# Patient Record
Sex: FEMALE | Race: WHITE | NOT HISPANIC OR LATINO | Employment: FULL TIME | URBAN - METROPOLITAN AREA
[De-identification: names, ages, dates, MRNs, and addresses within clinical notes are randomized per-mention and may not be internally consistent; named-entity substitution may affect disease eponyms.]

---

## 2024-10-19 ENCOUNTER — HOSPITAL ENCOUNTER (EMERGENCY)
Facility: HOSPITAL | Age: 24
Discharge: HOME | End: 2024-10-19
Attending: EMERGENCY MEDICINE

## 2024-10-19 VITALS
SYSTOLIC BLOOD PRESSURE: 141 MMHG | DIASTOLIC BLOOD PRESSURE: 86 MMHG | WEIGHT: 228 LBS | HEIGHT: 64 IN | TEMPERATURE: 98 F | HEART RATE: 94 BPM | OXYGEN SATURATION: 98 % | BODY MASS INDEX: 38.93 KG/M2 | RESPIRATION RATE: 18 BRPM

## 2024-10-19 DIAGNOSIS — K04.7 DENTAL ABSCESS: ICD-10-CM

## 2024-10-19 DIAGNOSIS — K08.89 PAIN, DENTAL: Primary | ICD-10-CM

## 2024-10-19 LAB — B-HCG UR QL: NEGATIVE

## 2024-10-19 PROCEDURE — 81025 URINE PREGNANCY TEST: CPT | Performed by: PHYSICIAN ASSISTANT

## 2024-10-19 PROCEDURE — 99283 EMERGENCY DEPT VISIT LOW MDM: CPT

## 2024-10-19 RX ORDER — CLINDAMYCIN HYDROCHLORIDE 150 MG/1
300 CAPSULE ORAL 4 TIMES DAILY
Qty: 56 CAPSULE | Refills: 0 | Status: SHIPPED | OUTPATIENT
Start: 2024-10-19 | End: 2024-10-26

## 2024-10-19 NOTE — ED NOTES
Pt requesting to leave before treatment complete so that she can make it to work on time. Pt reports she will return later, in hopes of receiving antibiotics for her dental issues. Notified Registration, Charge RN and MD. Attempted to come back and tell the patient but she had already left the ER upon my return.

## 2024-10-19 NOTE — ED PROVIDER NOTES
Encounter Date: 10/19/2024       History     Chief Complaint   Patient presents with    Dental Pain     Began 1 week ago in the top right of the mouth and has worsened.      Patient presents to the ER with complaint of left upper and right lower dental pain.  The patient states it has been going on for last several days states she has not been evaluated by a dentist recently reports it is painful nonradiating nothing makes it better brushing her teeth and touching around the tooth makes it worse and denies other associated symptoms.    The history is provided by the patient.     Review of patient's allergies indicates:  No Known Allergies  Past Medical History:   Diagnosis Date    ADHD     Anxiety     Depression     PTSD (post-traumatic stress disorder)      History reviewed. No pertinent surgical history.  No family history on file.  Social History     Tobacco Use    Smoking status: Every Day     Current packs/day: 0.50     Types: Cigarettes    Smokeless tobacco: Never   Substance Use Topics    Alcohol use: Yes     Comment: Occasional    Drug use: No     Types: Marijuana     Comment: quit 2 years ago     Review of Systems   Constitutional:  Negative for chills and fever.   HENT:  Positive for dental problem. Negative for congestion, drooling, ear discharge, ear pain, facial swelling, hearing loss and sore throat.    Respiratory:  Negative for shortness of breath.    Cardiovascular:  Negative for chest pain.   Gastrointestinal:  Negative for abdominal pain, constipation, diarrhea, nausea and vomiting.   Genitourinary:  Negative for dysuria.   Musculoskeletal:  Negative for back pain.   Skin:  Negative for rash.   Neurological:  Negative for weakness.   Hematological:  Does not bruise/bleed easily.   All other systems reviewed and are negative.      Physical Exam     Initial Vitals [10/19/24 1530]   BP Pulse Resp Temp SpO2   (!) 141/86 94 18 98.2 °F (36.8 °C) 98 %      MAP       --         Physical Exam    Nursing  note and vitals reviewed.  Constitutional: She appears well-developed and well-nourished.   HENT:   Head: Atraumatic. Mouth/Throat: Oropharynx is clear and moist and mucous membranes are normal. She does not have dentures. Abnormal dentition. Dental abscesses and dental caries present. No uvula swelling.   Eyes: Right eye exhibits no discharge. Left eye exhibits no discharge.   Neck: Neck supple.   Normal range of motion.  Cardiovascular:  Normal rate, regular rhythm, normal heart sounds and intact distal pulses.     Exam reveals no gallop and no friction rub.       No murmur heard.  Pulmonary/Chest: Breath sounds normal. No respiratory distress. She has no wheezes. She has no rhonchi. She has no rales. She exhibits no tenderness.   Musculoskeletal:         General: Normal range of motion.      Cervical back: Normal range of motion and neck supple.     Neurological: She is alert and oriented to person, place, and time. GCS score is 15. GCS eye subscore is 4. GCS verbal subscore is 5. GCS motor subscore is 6.   Skin: Skin is warm and dry. Capillary refill takes less than 2 seconds.   Psychiatric: She has a normal mood and affect. Thought content normal.         ED Course   Procedures  Labs Reviewed   PREGNANCY TEST, URINE RAPID       Result Value    Preg Test, Ur Negative      Narrative:     Specimen Source->Urine          Imaging Results    None          Medications - No data to display  Medical Decision Making  Discussed with the patient exam findings plan of care with prescribed medications discussed with the patient need for follow up with Dentistry as well as return to ER precautions discussed use of over-the-counter Tylenol with the prescribed Naprosyn the patient verbalized her understanding and her questions were answered.  At time of discharge patient is stable nontoxic appearing and is stable for outpatient management.      Differential diagnosis includes but is not limited to periapical abscess,  periodontal abscess, dental abscess, dental fracture, dental isael     This note was created using [employer] voice recognition software that occasionally misinterpreted phrases or words.    Problems Addressed:  Dental abscess: acute illness or injury  Pain, dental: acute illness or injury    Risk  Prescription drug management.                                      Clinical Impression:  Final diagnoses:  [K08.89] Pain, dental (Primary)  [K04.7] Dental abscess          ED Disposition Condition    Discharge Stable          ED Prescriptions       Medication Sig Dispense Start Date End Date Auth. Provider    clindamycin (CLEOCIN) 150 MG capsule Take 2 capsules (300 mg total) by mouth 4 (four) times daily. for 7 days 56 capsule 10/19/2024 10/26/2024 Raheem An PA          Follow-up Information       Follow up With Specialties Details Why Contact [facility] Emergency Medicine  If symptoms worsen [address] [facility] [address]  [phone]    Your PCP  Schedule an appointment as soon as possible for a visit in 1 day               Raheem An PA  10/19/24 2040

## 2024-12-14 ENCOUNTER — HOSPITAL ENCOUNTER (EMERGENCY)
Facility: HOSPITAL | Age: 24
Discharge: HOME | End: 2024-12-14
Attending: EMERGENCY MEDICINE

## 2024-12-14 VITALS
SYSTOLIC BLOOD PRESSURE: 133 MMHG | BODY MASS INDEX: 38.93 KG/M2 | DIASTOLIC BLOOD PRESSURE: 83 MMHG | RESPIRATION RATE: 20 BRPM | TEMPERATURE: 98 F | OXYGEN SATURATION: 99 % | HEIGHT: 64 IN | WEIGHT: 228 LBS | HEART RATE: 96 BPM

## 2024-12-14 DIAGNOSIS — R55 NEAR SYNCOPE: ICD-10-CM

## 2024-12-14 DIAGNOSIS — R55 VASOVAGAL SYMPTOM: Primary | ICD-10-CM

## 2024-12-14 LAB
ALBUMIN SERPL BCP-MCNC: 4.1 G/DL (ref 3.5–5.2)
ALP SERPL-CCNC: 63 U/L (ref 40–150)
ALT SERPL W/O P-5'-P-CCNC: 12 U/L (ref 10–44)
ANION GAP SERPL CALC-SCNC: 9 MMOL/L (ref 8–16)
AST SERPL-CCNC: 21 U/L (ref 10–40)
B-HCG UR QL: NEGATIVE
BACTERIA #/AREA URNS HPF: NORMAL /HPF
BASOPHILS # BLD AUTO: 0.04 K/UL (ref 0–0.2)
BASOPHILS NFR BLD: 0.4 % (ref 0–1.9)
BILIRUB SERPL-MCNC: 1.2 MG/DL (ref 0.1–1)
BILIRUB UR QL STRIP: NEGATIVE
BUN SERPL-MCNC: 6 MG/DL (ref 6–20)
CALCIUM SERPL-MCNC: 9.5 MG/DL (ref 8.7–10.5)
CHLORIDE SERPL-SCNC: 105 MMOL/L (ref 95–110)
CLARITY UR: CLEAR
CO2 SERPL-SCNC: 24 MMOL/L (ref 23–29)
COLOR UR: YELLOW
CREAT SERPL-MCNC: 0.7 MG/DL (ref 0.5–1.4)
DIFFERENTIAL METHOD BLD: NORMAL
EOSINOPHIL # BLD AUTO: 0.2 K/UL (ref 0–0.5)
EOSINOPHIL NFR BLD: 1.6 % (ref 0–8)
ERYTHROCYTE [DISTWIDTH] IN BLOOD BY AUTOMATED COUNT: 12.1 % (ref 11.5–14.5)
EST. GFR  (NO RACE VARIABLE): >60 ML/MIN/1.73 M^2
GLUCOSE SERPL-MCNC: 93 MG/DL (ref 70–110)
GLUCOSE UR QL STRIP: NEGATIVE
HCT VFR BLD AUTO: 44.8 % (ref 37–48.5)
HGB BLD-MCNC: 15.2 G/DL (ref 12–16)
HGB UR QL STRIP: ABNORMAL
HYALINE CASTS #/AREA URNS LPF: 0 /LPF
IMM GRANULOCYTES # BLD AUTO: 0.03 K/UL (ref 0–0.04)
IMM GRANULOCYTES NFR BLD AUTO: 0.3 % (ref 0–0.5)
KETONES UR QL STRIP: NEGATIVE
LEUKOCYTE ESTERASE UR QL STRIP: NEGATIVE
LYMPHOCYTES # BLD AUTO: 2.9 K/UL (ref 1–4.8)
LYMPHOCYTES NFR BLD: 28.3 % (ref 18–48)
MCH RBC QN AUTO: 29.3 PG (ref 27–31)
MCHC RBC AUTO-ENTMCNC: 33.9 G/DL (ref 32–36)
MCV RBC AUTO: 86 FL (ref 82–98)
MICROSCOPIC COMMENT: NORMAL
MONOCYTES # BLD AUTO: 0.5 K/UL (ref 0.3–1)
MONOCYTES NFR BLD: 4.7 % (ref 4–15)
NEUTROPHILS # BLD AUTO: 6.7 K/UL (ref 1.8–7.7)
NEUTROPHILS NFR BLD: 64.7 % (ref 38–73)
NITRITE UR QL STRIP: NEGATIVE
NRBC BLD-RTO: 0 /100 WBC
PH UR STRIP: 7 [PH] (ref 5–8)
PLATELET # BLD AUTO: 294 K/UL (ref 150–450)
PMV BLD AUTO: 9.9 FL (ref 9.2–12.9)
POTASSIUM SERPL-SCNC: 4 MMOL/L (ref 3.5–5.1)
PROT SERPL-MCNC: 7.8 G/DL (ref 6–8.4)
PROT UR QL STRIP: ABNORMAL
RBC # BLD AUTO: 5.19 M/UL (ref 4–5.4)
RBC #/AREA URNS HPF: 2 /HPF (ref 0–4)
SODIUM SERPL-SCNC: 138 MMOL/L (ref 136–145)
SP GR UR STRIP: >=1.03 (ref 1–1.03)
SQUAMOUS #/AREA URNS HPF: 5 /HPF
URN SPEC COLLECT METH UR: ABNORMAL
UROBILINOGEN UR STRIP-ACNC: 1 EU/DL
WBC # BLD AUTO: 10.34 K/UL (ref 3.9–12.7)
WBC #/AREA URNS HPF: 2 /HPF (ref 0–5)

## 2024-12-14 PROCEDURE — 96361 HYDRATE IV INFUSION ADD-ON: CPT

## 2024-12-14 PROCEDURE — 81000 URINALYSIS NONAUTO W/SCOPE: CPT | Performed by: EMERGENCY MEDICINE

## 2024-12-14 PROCEDURE — 85025 COMPLETE CBC W/AUTO DIFF WBC: CPT | Performed by: EMERGENCY MEDICINE

## 2024-12-14 PROCEDURE — 80053 COMPREHEN METABOLIC PANEL: CPT | Performed by: EMERGENCY MEDICINE

## 2024-12-14 PROCEDURE — 99284 EMERGENCY DEPT VISIT MOD MDM: CPT | Mod: 25

## 2024-12-14 PROCEDURE — 81025 URINE PREGNANCY TEST: CPT | Performed by: EMERGENCY MEDICINE

## 2024-12-14 PROCEDURE — 96360 HYDRATION IV INFUSION INIT: CPT

## 2024-12-14 PROCEDURE — 25000003 PHARM REV CODE 250: Performed by: EMERGENCY MEDICINE

## 2024-12-14 PROCEDURE — 93010 ELECTROCARDIOGRAM REPORT: CPT | Mod: ,,, | Performed by: INTERNAL MEDICINE

## 2024-12-14 PROCEDURE — 93005 ELECTROCARDIOGRAM TRACING: CPT

## 2024-12-14 RX ADMIN — SODIUM CHLORIDE 500 ML: 9 INJECTION, SOLUTION INTRAVENOUS at 03:12

## 2024-12-14 NOTE — ED PROVIDER NOTES
"   History     Chief Complaint   Patient presents with    Dizziness     PT stated she was at work got really dizzy and thought she was going to pass out. Secondary c/o "feeling like she has to poop but she can't and it's painful."     HPI:  Ivon Garcia is a 24 y.o. female with PMH as below who presents to the [facility] emergency department for evaluation of near-syncopal sensation at work where she suddenly felt dizzy. She had diarrhea yesterday, then today felt sensation like she needed to have a BM. She also notes her last menstrual period was irregular, she has an  birth control implant, and she has cramping lower abdominal pain. She also mentions she's only had Mountain Dew and extra sweet tea to drink for the last 4 months with no pure water intake.     PCP: Eliane Soni MD    Review of patient's allergies indicates:  No Known Allergies   Past Medical History:   Diagnosis Date    ADHD     Anxiety     Depression     PTSD (post-traumatic stress disorder)      History reviewed. No pertinent surgical history.    No family history on file.  Social History     Tobacco Use    Smoking status: Every Day     Current packs/day: 0.50     Types: Cigarettes    Smokeless tobacco: Never   Substance and Sexual Activity    Alcohol use: Yes     Comment: Occasional    Drug use: No     Types: Marijuana     Comment: quit 2 years ago    Sexual activity: Yes     Partners: Male     Birth control/protection: Implant      Review of Systems     Review of Systems   Constitutional: Negative.    HENT: Negative.     Eyes: Negative.    Respiratory: Negative.     Cardiovascular: Negative.    Gastrointestinal: Negative.    Endocrine: Negative.    Genitourinary: Negative.    Musculoskeletal: Negative.    Skin: Negative.    Allergic/Immunologic: Negative.    Neurological: Negative.    Hematological: Negative.    Psychiatric/Behavioral: Negative.     All other systems reviewed and are negative.       Physical Exam     Initial " "Vitals [12/14/24 1535]   BP Pulse Resp Temp SpO2   (!) 140/80 96 20 97.8 °F (36.6 °C) 99 %      MAP       --          Nursing notes and vital signs reviewed.  Constitutional: Patient is in no acute distress.   Head: Normocephalic. Atraumatic.   Eyes:  Conjunctivae are not pale. No scleral icterus.   ENT: Mucous membranes moist.   Neck: Supple.   Cardiovascular: Regular rate. Regular rhythm. No murmurs, rubs, or gallops   Pulmonary: No respiratory distress.   Abdominal: Soft. Non-distended. Nontender.    Musculoskeletal: Moves all extremities. No obvious deformities.   Skin: Warm and dry.   Neurological:  Alert, awake, and appropriate. Normal speech. No acute lateralizing neurologic deficits appreciated.   Psychiatric: Normal affect.       ED Course   Procedures  Vitals:    12/14/24 1535   BP: (!) 140/80   Pulse: 96   Resp: 20   Temp: 97.8 °F (36.6 °C)   TempSrc: Oral   SpO2: 99%   Weight: 103.4 kg (228 lb)   Height: 5' 4" (1.626 m)     Lab Results Interpreted as Abnormal:  Labs Reviewed   COMPREHENSIVE METABOLIC PANEL - Abnormal       Result Value    Sodium 138      Potassium 4.0      Chloride 105      CO2 24      Glucose 93      BUN 6      Creatinine 0.7      Calcium 9.5      Total Protein 7.8      Albumin 4.1      Total Bilirubin 1.2 (*)     Alkaline Phosphatase 63      AST 21      ALT 12      eGFR >60.0      Anion Gap 9     URINALYSIS - Abnormal    Specimen UA Urine, Unspecified      Color, UA Yellow      Appearance, UA Clear      pH, UA 7.0      Specific Gravity, UA >=1.030 (*)     Protein, UA 2+ (*)     Glucose, UA Negative      Ketones, UA Negative      Bilirubin (UA) Negative      Occult Blood UA Trace (*)     Nitrite, UA Negative      Urobilinogen, UA 1.0      Leukocytes, UA Negative      Narrative:     Collection Type->Urine, Clean Catch   CBC W/ AUTO DIFFERENTIAL    WBC 10.34      RBC 5.19      Hemoglobin 15.2      Hematocrit 44.8      MCV 86      MCH 29.3      MCHC 33.9      RDW 12.1      Platelets 294   "    MPV 9.9      Immature Granulocytes 0.3      Gran # (ANC) 6.7      Immature Grans (Abs) 0.03      Lymph # 2.9      Mono # 0.5      Eos # 0.2      Baso # 0.04      nRBC 0      Gran % 64.7      Lymph % 28.3      Mono % 4.7      Eosinophil % 1.6      Basophil % 0.4      Differential Method Automated     URINALYSIS MICROSCOPIC    RBC, UA 2      WBC, UA 2      Bacteria Rare      Squam Epithel, UA 5      Hyaline Casts, UA 0      Microscopic Comment SEE COMMENT      Narrative:     Collection Type->Urine, Clean Catch   PREGNANCY TEST, URINE RAPID   PREGNANCY TEST, URINE RAPID    Preg Test, Ur Negative      Narrative:     Collection Type->Urine, Clean Catch      All Lab Results:  Results for orders placed or performed during the hospital encounter of 12/14/24   Urinalysis Clean Catch    Collection Time: 12/14/24  3:55 PM   Result Value Ref Range    Specimen UA Urine, Unspecified     Color, UA Yellow Yellow, Straw, Nadya    Appearance, UA Clear Clear    pH, UA 7.0 5.0 - 8.0    Specific Gravity, UA >=1.030 (A) 1.005 - 1.030    Protein, UA 2+ (A) Negative    Glucose, UA Negative Negative    Ketones, UA Negative Negative    Bilirubin (UA) Negative Negative    Occult Blood UA Trace (A) Negative    Nitrite, UA Negative Negative    Urobilinogen, UA 1.0 Negative EU/dL    Leukocytes, UA Negative Negative   Urinalysis Microscopic    Collection Time: 12/14/24  3:55 PM   Result Value Ref Range    RBC, UA 2 0 - 4 /hpf    WBC, UA 2 0 - 5 /hpf    Bacteria Rare None-Occ /hpf    Squam Epithel, UA 5 /hpf    Hyaline Casts, UA 0 0-1/lpf /lpf    Microscopic Comment SEE COMMENT    Pregnancy, urine rapid    Collection Time: 12/14/24  3:55 PM   Result Value Ref Range    Preg Test, Ur Negative    CBC auto differential    Collection Time: 12/14/24  3:57 PM   Result Value Ref Range    WBC 10.34 3.90 - 12.70 K/uL    RBC 5.19 4.00 - 5.40 M/uL    Hemoglobin 15.2 12.0 - 16.0 g/dL    Hematocrit 44.8 37.0 - 48.5 %    MCV 86 82 - 98 fL    MCH 29.3 27.0 -  31.0 pg    MCHC 33.9 32.0 - 36.0 g/dL    RDW 12.1 11.5 - 14.5 %    Platelets 294 150 - 450 K/uL    MPV 9.9 9.2 - 12.9 fL    Immature Granulocytes 0.3 0.0 - 0.5 %    Gran # (ANC) 6.7 1.8 - 7.7 K/uL    Immature Grans (Abs) 0.03 0.00 - 0.04 K/uL    Lymph # 2.9 1.0 - 4.8 K/uL    Mono # 0.5 0.3 - 1.0 K/uL    Eos # 0.2 0.0 - 0.5 K/uL    Baso # 0.04 0.00 - 0.20 K/uL    nRBC 0 0 /100 WBC    Gran % 64.7 38.0 - 73.0 %    Lymph % 28.3 18.0 - 48.0 %    Mono % 4.7 4.0 - 15.0 %    Eosinophil % 1.6 0.0 - 8.0 %    Basophil % 0.4 0.0 - 1.9 %    Differential Method Automated    Comprehensive metabolic panel    Collection Time: 12/14/24  3:57 PM   Result Value Ref Range    Sodium 138 136 - 145 mmol/L    Potassium 4.0 3.5 - 5.1 mmol/L    Chloride 105 95 - 110 mmol/L    CO2 24 23 - 29 mmol/L    Glucose 93 70 - 110 mg/dL    BUN 6 6 - 20 mg/dL    Creatinine 0.7 0.5 - 1.4 mg/dL    Calcium 9.5 8.7 - 10.5 mg/dL    Total Protein 7.8 6.0 - 8.4 g/dL    Albumin 4.1 3.5 - 5.2 g/dL    Total Bilirubin 1.2 (H) 0.1 - 1.0 mg/dL    Alkaline Phosphatase 63 40 - 150 U/L    AST 21 10 - 40 U/L    ALT 12 10 - 44 U/L    eGFR >60.0 >60 mL/min/1.73 m^2    Anion Gap 9 8 - 16 mmol/L     Imaging Results    None          The emergency physician reviewed the vital signs / test results outlined above.     ED Discussion     ED Course as of 12/14/24 1723   Sat Dec 14, 2024   1646 The EKG was ordered, reviewed, and independently interpreted by the ED Physician:  Physician interpreting: Marbin Yanez MD  Rhythm: normal sinus  Rate: 73 bpm  No ST-T changes concerning for acute ischemia.  Normal intervals   Normal axis   [ND]      ED Course User Index  [ND] Marbin Yanez MD     Patient's evaluation in the ED does not suggest any emergent or life-threatening medical conditions requiring immediate intervention beyond what was provided in the ED, and I believe patient is safe for discharge. Regardless, an unremarkable evaluation in the ED does not preclude the  development or presence of a serious or life-threatening condition. As such, patient was given return instructions for any change or worsening of symptoms.       ED Medication(s) Administered:  Medications   sodium chloride 0.9% bolus 500 mL 500 mL (500 mLs Intravenous New Bag 12/14/24 7922)       Prescription Management: I performed a review of the patient's current Rx medication list as input by nursing staff.    Current Discharge Medication List        STOP taking these medications       albuterol (PROVENTIL/VENTOLIN HFA) 90 mcg/actuation inhaler Comments:   Reason for Stopping:         amoxicillin-clavulanate 875-125mg (AUGMENTIN) 875-125 mg per tablet Comments:   Reason for Stopping:         ciprofloxacin-dexamethasone 0.3-0.1% (CIPRODEX) 0.3-0.1 % DrpS Comments:   Reason for Stopping:         HYDROcodone-acetaminophen (NORCO) 7.5-325 mg per tablet Comments:   Reason for Stopping:         ibuprofen (ADVIL,MOTRIN) 600 MG tablet Comments:   Reason for Stopping:         methylPREDNISolone (MEDROL DOSEPACK) 4 mg tablet Comments:   Reason for Stopping:                 Follow-up Information       Eliane Soni MD. Schedule an appointment as soon as possible for a visit in 3 days.    Specialty: Pediatrics  Contact information:  [address] [place] [address] [phone]               [facility] Emergency Dept.    Specialty: Emergency Medicine  Why: As needed, If symptoms worsen  Contact information:  [address] [facility] [address] [phone]                          Clinical Impression       ICD-10-CM ICD-9-CM   1. Vasovagal symptom  R55 780.2   2. Near syncope  R55 780.2      ED Disposition Condition    Discharge Stable             Marbin Yanez MD  12/14/24 5082

## 2024-12-16 LAB
OHS QRS DURATION: 96 MS
OHS QTC CALCULATION: 398 MS

## 2025-06-12 ENCOUNTER — HOSPITAL ENCOUNTER (EMERGENCY)
Facility: HOSPITAL | Age: 25
Discharge: HOME | End: 2025-06-12

## 2025-06-12 VITALS
OXYGEN SATURATION: 99 % | SYSTOLIC BLOOD PRESSURE: 138 MMHG | TEMPERATURE: 98 F | HEART RATE: 88 BPM | HEIGHT: 64 IN | RESPIRATION RATE: 20 BRPM | DIASTOLIC BLOOD PRESSURE: 68 MMHG | BODY MASS INDEX: 40.36 KG/M2 | WEIGHT: 236.38 LBS

## 2025-06-12 DIAGNOSIS — J02.9 EXUDATIVE PHARYNGITIS: Primary | ICD-10-CM

## 2025-06-12 LAB
GROUP A STREP MOLECULAR (OHS): NEGATIVE
INFLUENZA A MOLECULAR (OHS): NEGATIVE
INFLUENZA B MOLECULAR (OHS): NEGATIVE
SARS-COV-2 RDRP RESP QL NAA+PROBE: NEGATIVE

## 2025-06-12 PROCEDURE — U0002 COVID-19 LAB TEST NON-CDC: HCPCS | Performed by: NURSE PRACTITIONER

## 2025-06-12 PROCEDURE — 87651 STREP A DNA AMP PROBE: CPT | Performed by: NURSE PRACTITIONER

## 2025-06-12 PROCEDURE — 99284 EMERGENCY DEPT VISIT MOD MDM: CPT | Mod: 25

## 2025-06-12 PROCEDURE — 96372 THER/PROPH/DIAG INJ SC/IM: CPT | Performed by: NURSE PRACTITIONER

## 2025-06-12 PROCEDURE — 63600175 PHARM REV CODE 636 W HCPCS: Performed by: NURSE PRACTITIONER

## 2025-06-12 PROCEDURE — 87502 INFLUENZA DNA AMP PROBE: CPT | Performed by: NURSE PRACTITIONER

## 2025-06-12 RX ORDER — BETAMETHASONE SODIUM PHOSPHATE AND BETAMETHASONE ACETATE 3; 3 MG/ML; MG/ML
6 INJECTION, SUSPENSION INTRA-ARTICULAR; INTRALESIONAL; INTRAMUSCULAR; SOFT TISSUE
Status: COMPLETED | OUTPATIENT
Start: 2025-06-12 | End: 2025-06-12

## 2025-06-12 RX ORDER — AMOXICILLIN AND CLAVULANATE POTASSIUM 875; 125 MG/1; MG/1
1 TABLET, FILM COATED ORAL 2 TIMES DAILY
Qty: 14 TABLET | Refills: 0 | Status: SHIPPED | OUTPATIENT
Start: 2025-06-12

## 2025-06-12 RX ADMIN — BETAMETHASONE ACETATE AND BETAMETHASONE SODIUM PHOSPHATE 6 MG: 3; 3 INJECTION, SUSPENSION INTRA-ARTICULAR; INTRALESIONAL; INTRAMUSCULAR; SOFT TISSUE at 06:06

## 2025-06-12 NOTE — ED PROVIDER NOTES
Encounter Date: 6/12/2025       History     Chief Complaint   Patient presents with    Sore Throat     Reports throat pain with difficulty swallowing, redness and swelling and fatigue x 4 days    Cough     Reports cough/congestion with wheezing x 18 days. Denies fever    Otalgia     Reports right ear pain      2 week history cough/sore throat. Does not think she has had any fever. Feels SOB when coughing. States has been sick for 18 days. Fatigued for 4 days. Wants to sleep a lot.     The history is provided by the patient.   General Illness   The current episode started several days ago. The problem occurs continuously. The problem has been gradually worsening. The pain is at a severity of 10/10. Nothing relieves the symptoms. Nothing aggravates the symptoms. Associated symptoms include ear pain, sore throat, cough and wheezing. Pertinent negatives include no fever, no nausea, no vomiting and no rash.     Review of patient's allergies indicates:  No Known Allergies  Past Medical History:   Diagnosis Date    ADHD     Anxiety     Chronic tonsillitis     Depression     PTSD (post-traumatic stress disorder)      History reviewed. No pertinent surgical history.  No family history on file.  Social History[1]  Review of Systems   Constitutional:  Negative for fever.   HENT:  Positive for ear pain and sore throat.    Respiratory:  Positive for cough and wheezing.    Gastrointestinal:  Negative for nausea and vomiting.   Skin:  Negative for rash.       Physical Exam     Initial Vitals [06/12/25 1620]   BP Pulse Resp Temp SpO2   (!) 140/65 85 16 98.2 °F (36.8 °C) 98 %      MAP       --         Physical Exam    Nursing note and vitals reviewed.  Constitutional: She appears well-developed and well-nourished.   HENT:   Head: Normocephalic and atraumatic.   TMs full/dull.  + frontal and maxillary tenderness to palpation + purulent posterior nasal drainage   Eyes: EOM are normal.   Neck: Neck supple.   Cardiovascular:  Normal  rate and regular rhythm.           Pulmonary/Chest: Breath sounds normal. She has no wheezes.   Abdominal: Abdomen is soft. There is no abdominal tenderness.   Musculoskeletal:      Cervical back: Neck supple.     Neurological: She is alert and oriented to person, place, and time.   Skin: Skin is warm and dry. Capillary refill takes less than 2 seconds.   Psychiatric: She has a normal mood and affect.         ED Course   Procedures  Labs Reviewed   INFLUENZA A & B BY MOLECULAR - Normal       Result Value    INFLUENZA A MOLECULAR Negative      INFLUENZA B MOLECULAR  Negative     GROUP A STREP, MOLECULAR - Normal    Group A Strep Molecular Negative      Narrative:     Arcanobacterium haemolyticum and Beta Streptococcus group C and G will not be detected by this test method.  Please order Throat Culture ([id number]) if suspected.       SARS-COV-2 RNA AMPLIFICATION, QUAL - Normal    SARS COV-2 Molecular Negative     HETEROPHILE AB SCREEN   CBC W/ AUTO DIFFERENTIAL    Narrative:     The following orders were created for panel order Complete Blood Count (CBC).  Procedure                               Abnormality         Status                     ---------                               -----------         ------                     CBC with Differential[[id number]]                                                        Please view results for these tests on the individual orders.   BASIC METABOLIC PANEL   CBC WITH DIFFERENTIAL          Imaging Results    None          Medications   betamethasone acetate-betamethasone sodium phosphate injection 6 mg (has no administration in time range)     Medical Decision Making  24-year-old female comes in with three-week history of coughing congestion.  States for the last few days she has had a very severe sore throat.  She feels very fatigued.  She does not think she has had fever.  She also has right ear pain.  She has taken over-the-counter medication with no improvement in her  symptoms      Differential diagnoses:  Strep throat, viral URI, bacterial sinusitis, exudative pharyngitis, viral pharyngitis, mononucleosis    Amount and/or Complexity of Data Reviewed  Labs: ordered. Decision-making details documented in ED Course.    Risk  Prescription drug management.               ED Course as of 06/12/25 1809   Thu Jun 12, 2025   1703 Group A Strep, Molecular  Strep negative [NP]   1703 COVID-19 Rapid Screening  Covid negative [NP]   1703 Influenza A & B by Molecular  Influenza A & B negative [NP]   1802 Refuses venipuncture for labs. Requests to get a steroid injection and antibiotics and be discharged [NP]      ED Course User Index  [NP] Samra Chaudhry FNP                           Clinical Impression:  Final diagnoses:  [J02.9] Exudative pharyngitis (Primary)          ED Disposition Condition    Discharge Good          ED Prescriptions       Medication Sig Dispense Start Date End Date Auth. Provider    amoxicillin-clavulanate 875-125mg (AUGMENTIN) 875-125 mg per tablet Take 1 tablet by mouth 2 (two) times daily. 14 tablet 6/12/2025 -- Samra Chaudhry FNP          Follow-up Information       Follow up With Specialties Details Why Contact Info    Eliane Soni MD Pediatrics  As needed [address] [place] [address] [address]  [place] [address] [phone]                     [1]   Social History  Tobacco Use    Smoking status: Every Day     Current packs/day: 0.50     Types: Cigarettes    Smokeless tobacco: Never   Substance Use Topics    Alcohol use: Yes     Comment: Occasional    Drug use: No     Types: Marijuana     Comment: quit 2 years ago        Samra Chaudhry FNP  06/12/25 1809

## 2025-06-12 NOTE — Clinical Note
"Ivon Tapia (LILIAN)sushil was seen and treated in our emergency department on 6/12/2025.  She may return to work on 06/14/2025.       If you have any questions or concerns, please don't hesitate to call.       RN    "

## 2025-06-12 NOTE — ED NOTES
Patient refuses lab work. States she would just like to get antibiotics and a steroid shot. Notified provider